# Patient Record
Sex: FEMALE | URBAN - METROPOLITAN AREA
[De-identification: names, ages, dates, MRNs, and addresses within clinical notes are randomized per-mention and may not be internally consistent; named-entity substitution may affect disease eponyms.]

---
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
## 2025-08-04 ENCOUNTER — RX ONLY (RX ONLY)
Age: 20
End: 2025-08-04

## 2025-08-04 RX ORDER — SPIRONOLACTONE 50 MG/1
TABLET, FILM COATED ORAL BID
Qty: 180 | Refills: 1 | Status: ERX | COMMUNITY
Start: 2025-08-04